# Patient Record
Sex: MALE | Race: OTHER | HISPANIC OR LATINO | URBAN - METROPOLITAN AREA
[De-identification: names, ages, dates, MRNs, and addresses within clinical notes are randomized per-mention and may not be internally consistent; named-entity substitution may affect disease eponyms.]

---

## 2019-12-27 ENCOUNTER — EMERGENCY (EMERGENCY)
Facility: HOSPITAL | Age: 19
LOS: 0 days | Discharge: HOME | End: 2019-12-27
Attending: STUDENT IN AN ORGANIZED HEALTH CARE EDUCATION/TRAINING PROGRAM
Payer: OTHER MISCELLANEOUS

## 2019-12-27 VITALS
DIASTOLIC BLOOD PRESSURE: 72 MMHG | HEART RATE: 72 BPM | TEMPERATURE: 98 F | OXYGEN SATURATION: 100 % | SYSTOLIC BLOOD PRESSURE: 133 MMHG | RESPIRATION RATE: 18 BRPM

## 2019-12-27 VITALS — HEIGHT: 70 IN | WEIGHT: 149.91 LBS

## 2019-12-27 DIAGNOSIS — S61.411A LACERATION WITHOUT FOREIGN BODY OF RIGHT HAND, INITIAL ENCOUNTER: ICD-10-CM
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
## 2019-12-27 DIAGNOSIS — Z23 ENCOUNTER FOR IMMUNIZATION: ICD-10-CM
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
## 2019-12-27 PROCEDURE — 12001 RPR S/N/AX/GEN/TRNK 2.5CM/<: CPT

## 2019-12-27 PROCEDURE — 73120 X-RAY EXAM OF HAND: CPT | Mod: RT

## 2019-12-27 PROCEDURE — 73120 X-RAY EXAM OF HAND: CPT | Mod: 26,RT

## 2019-12-27 PROCEDURE — 99283 EMERGENCY DEPT VISIT LOW MDM: CPT | Mod: 25

## 2019-12-27 PROCEDURE — 90471 IMMUNIZATION ADMIN: CPT

## 2019-12-27 PROCEDURE — 90715 TDAP VACCINE 7 YRS/> IM: CPT

## 2019-12-27 PROCEDURE — 99283 EMERGENCY DEPT VISIT LOW MDM: CPT

## 2019-12-27 RX ORDER — TETANUS TOXOID, REDUCED DIPHTHERIA TOXOID AND ACELLULAR PERTUSSIS VACCINE, ADSORBED 5; 2.5; 8; 8; 2.5 [IU]/.5ML; [IU]/.5ML; UG/.5ML; UG/.5ML; UG/.5ML
0.5 SUSPENSION INTRAMUSCULAR ONCE
Refills: 0 | Status: COMPLETED | OUTPATIENT
Start: 2019-12-27 | End: 2019-12-27

## 2019-12-27 RX ADMIN — TETANUS TOXOID, REDUCED DIPHTHERIA TOXOID AND ACELLULAR PERTUSSIS VACCINE, ADSORBED 0.5 MILLILITER(S): 5; 2.5; 8; 8; 2.5 SUSPENSION INTRAMUSCULAR at 20:04

## 2019-12-27 NOTE — ED STATDOCS - PROGRESS NOTE DETAILS
19 y.o M presents with laceration. pt cut his hand on a piece of glass at 1830 this evening. No numbness or tingling, motor or sensory deficit or other complaints at this time  Ext: 1cm superficial laceration between 3rd and 4th metacarpal. FROM 5/5 muscle strength. -Bon Charlton PA-C Wound cleansed with copious ns, lac repaired, wound care instructions discussed with pt, will FU with pmd. -Bon Charlton PA-C

## 2019-12-27 NOTE — ED STATDOCS - PATIENT PORTAL LINK FT
You can access the FollowMyHealth Patient Portal offered by [facility] by registering at the following website: [email or web address]. By joining [employer]’s FollowMyHealth portal, you will also be able to view your health information using other applications (apps) compatible with our system.

## 2019-12-27 NOTE — ED ADULT NURSE NOTE - NSIMPLEMENTINTERV_GEN_ALL_ED
Implemented All Universal Safety Interventions:  [place] to call system. Call bell, personal items and telephone within reach. Instruct patient to call for assistance. Room bathroom lighting operational. Non-slip footwear when patient is off stretcher. Physically safe environment: no spills, clutter or unnecessary equipment. Stretcher in lowest position, wheels locked, appropriate side rails in place.

## 2019-12-27 NOTE — ED STATDOCS - OBJECTIVE STATEMENT
18 y/o male with no PMHx presents to the ED c/o +lacerations to right hand around 6:30 PM tonight. Pt cut his hand on broken glass cup at work. No fever. Pt is right handed. Unknown if Tetanus is UTD. Pt is Mexican speaking, father translated at bedside.

## 2019-12-27 NOTE — ED ADULT NURSE NOTE - OBJECTIVE STATEMENT
pt presents to the ED c/o +lacerations to right hand around 6:30 PM tonight. Pt cut his hand on broken glass cup at work. No fever. Pt is right handed. Unknown if Tetanus is UTD. Pt AOx4, breathing symmetrical and unlabored, in no acute distress at this time

## 2019-12-27 NOTE — ED STATDOCS - ATTENDING CONTRIBUTION TO CARE
I, Marquita Gallego DO,  performed the initial face to face bedside interview with this patient regarding history of present illness, review of symptoms and relevant past medical, social and family history.  I completed an independent physical examination.  I was the initial provider who evaluated this patient. I have signed out the follow up of any pending tests (i.e. labs, radiological studies) to the ACP.  I have communicated the patient’s plan of care and disposition with the ACP.  The history, relevant review of systems, past medical and surgical history, medical decision making, and physical examination was documented by the scribe in my presence and I attest to the accuracy of the documentation.

## 2019-12-27 NOTE — ED STATDOCS - NSFOLLOWUPINSTRUCTIONS_ED_ALL_ED_FT
ice to area x 24 hours; dressing to remain in place x 24 hours; then wound is to be cleaned with soap and water, dried well and rebandaged, after 48 hours patient can get wound wet in the shower, but sutures can never be drenched, they need to be completely dried after getting them wet; keep wound dry and clean, during daily activities try and be mindful that sutures are in place so that you do not reopen wound, wound should be checked in 2 days by your primary care doctor or return to the ED and suture removal should be done in 7-10 days by your regular doctor or you may return to ED for suture removal; if any significant redness, swelling, purulent drainage, or any severe increase in pain or if fever occurs, please return to ED immediately. if there are any other changes or worsening of symptoms patient is to return to ED
